# Patient Record
Sex: FEMALE | Race: BLACK OR AFRICAN AMERICAN | NOT HISPANIC OR LATINO | Employment: FULL TIME | URBAN - METROPOLITAN AREA
[De-identification: names, ages, dates, MRNs, and addresses within clinical notes are randomized per-mention and may not be internally consistent; named-entity substitution may affect disease eponyms.]

---

## 2019-10-10 ENCOUNTER — OFFICE VISIT (OUTPATIENT)
Dept: URGENT CARE | Facility: CLINIC | Age: 34
End: 2019-10-10
Payer: COMMERCIAL

## 2019-10-10 VITALS
HEIGHT: 67 IN | OXYGEN SATURATION: 100 % | RESPIRATION RATE: 16 BRPM | HEART RATE: 81 BPM | DIASTOLIC BLOOD PRESSURE: 69 MMHG | WEIGHT: 116 LBS | SYSTOLIC BLOOD PRESSURE: 105 MMHG | BODY MASS INDEX: 18.21 KG/M2 | TEMPERATURE: 97 F

## 2019-10-10 DIAGNOSIS — M79.644 THUMB PAIN, RIGHT: Primary | ICD-10-CM
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
## 2019-10-10 PROCEDURE — 99203 PR OFFICE/OUTPT VISIT, NEW, LEVL III, 30-44 MIN: ICD-10-PCS | Mod: S$GLB,,, | Performed by: NURSE PRACTITIONER

## 2019-10-10 PROCEDURE — 99203 OFFICE O/P NEW LOW 30 MIN: CPT | Mod: S$GLB,,, | Performed by: NURSE PRACTITIONER

## 2019-10-10 PROCEDURE — 73140 XR FINGER 2 OR MORE VIEWS: ICD-10-PCS | Mod: RT,S$GLB,, | Performed by: RADIOLOGY

## 2019-10-10 PROCEDURE — 73140 X-RAY EXAM OF FINGER(S): CPT | Mod: RT,S$GLB,, | Performed by: RADIOLOGY

## 2019-10-10 NOTE — LETTER
[facility] - [address], [address]  [facility] [address]  Phone: [phone]  Fax: [phone]  [facility]: [phone]    Pt Name: Dimas Lynn  Injury Date: 10/09/2019   Employee ID:  Date of  Treatment: 10/10/2019   Company: [employer] to [employer]       Appointment Time: 10:00 AM Arrived: 10:15AM   Provider: Marianna Li NP Time Out:11:45AM     Office Treatment:   1. Thumb pain, right    2. Work related injury          Patient Instructions: Attention not to aggravate affected area, Apply ice 24-48 hours then apply heat/warm soaks, Use splint as directed(TYLENOL OR IBUPROFEN AS DIRECTED)    Restrictions: Limited use of right hand and arm, Disabled until next office visit     Return Appointment: [facility] Monday, 10/14/2019 AT 12:00 PM

## 2019-10-10 NOTE — PROGRESS NOTES
"Subjective:       Patient ID: Dimas Lynn is a 33 y.o. female.    Vitals:  height is 5' 7" (1.702 m) and weight is 52.6 kg (116 lb). Her temperature is 97.2 °F (36.2 °C). Her blood pressure is 105/69 and her pulse is 81. Her respiration is 16 and oxygen saturation is 100%.     Chief Complaint: Hand Pain    Pt c/o right thumb pain x 1 day.   ORIGINAL INJURY: 10/9/2019- . WAS LIFTING BINS/CARRING BIN AND ATTEMPTED TO PULL OPEN DOOR, RIGHT THUMB BENT BACKWARDS- SLIGHT PAIN UPON INJURY AND HAS PROGRESSIVELY BECOME WORSE OVER THE PAST DAY. NO HX OF INJURY/FRACTURE TO THE RIGHT HAND. REPORTS PAIN WITH MOVEMENT, /STRENGTH.    Hand Pain    The incident occurred 12 to 24 hours ago. The incident occurred at work. The pain is present in the right fingers. The quality of the pain is described as aching. The pain does not radiate. The pain is at a severity of 3/10. The pain is mild. She has tried acetaminophen for the symptoms. The treatment provided mild relief.       Constitution: Negative for fatigue.   HENT: Negative for facial swelling and facial trauma.    Neck: Negative for neck stiffness.   Cardiovascular: Negative for chest trauma.   Eyes: Negative for eye trauma, double vision and blurred vision.   Gastrointestinal: Negative for abdominal trauma, abdominal pain and rectal bleeding.   Genitourinary: Negative for hematuria, missed menses, genital trauma and pelvic pain.   Musculoskeletal: Positive for pain, trauma and joint pain. Negative for joint swelling and abnormal ROM of joint.   Skin: Negative for color change, wound, abrasion, laceration and bruising.   Neurological: Negative for dizziness, history of vertigo, light-headedness, coordination disturbances, altered mental status and loss of consciousness.   Hematologic/Lymphatic: Negative for history of bleeding disorder.   Psychiatric/Behavioral: Negative for altered mental status.       Objective:      Physical Exam   Musculoskeletal:        " Right wrist: She exhibits normal range of motion, no tenderness, no bony tenderness, no deformity and no laceration.        Right hand: She exhibits decreased range of motion, tenderness and bony tenderness. She exhibits normal capillary refill, no laceration and no swelling. Normal sensation noted. Decreased strength noted. She exhibits finger abduction and thumb/finger opposition.        Hands:  PAIN AND LIMITED RIGHT THUMB FLEXION- PAIN ALONG DORSAL SURFACE , MEDIAL ASPECT OF HAND, NO PAIN AND FULL ROM WITH THUMB EXTENSION, FULL SENSATION, 2SEC CAP REFILL/WARM/PINK DISTAL TO INJURY.     Xr Finger 2 Or More Views    Result Date: 10/10/2019  EXAMINATION: XR FINGER 2 OR MORE VIEWS CLINICAL HISTORY: Pain in right finger(s) TECHNIQUE: Right thumb three views COMPARISON: None. FINDINGS: Bones are fairly well mineralized. Alignment is satisfactory. No fracture or significant soft tissue abnormality     No acute abnormality seen. Electronically signed by: Kenn Taylor MD Date:    10/10/2019 Time:    12:04        Assessment:       1. Thumb pain, right    2. Work related injury        Plan:     ICE 2-3 TIMES A DAY, WARM COMPRESS 1-2 TIMES , REST, DO NOT AGGRAVATE RIGHT HAND. SPOKE TO AUTHORIZING SUPERVISOR: BRITTANY CUEVA [phone]. Reviewed plan of care with dr. Renae.       ATTN NOT TO AGGRAVATE RIGHT HAND-  F/U WITH [facility] MONDAY FOR WORK RESTRICTIONS AND TREATMENT PLAN.   [facility] ADDR[facility]   [address] [place] [id number]      Thumb pain, right  -     XR FINGER 2 OR MORE VIEWS; Future; Expected date: 10/10/2019  -     ORTHOPEDIC BRACING FOR HOME USE - UPPER EXTREMITY  -     Ambulatory referral to Occupational Medicine    Work related injury  -     Ambulatory referral to Occupational Medicine      Patient Instructions     Follow up with your doctor in a few days.  Return to the urgent care or go to the ER if symptoms get worse.      Self-Care for Strains and Sprains  Most  minor strains and sprains can be treated with self-care. Recovering from a strain or sprain may take 6 to 8 weeks. Your self-care goal is to reduce pain and immobilize the injury to speed healing.     A sprain injures ligaments (tissue that connects bones to bones).        A strain injures muscles or tendons (tissue that connects muscles to bones).   Support the injured area  Wrapping the injured area provides support for short, necessary activities. Be careful not to wrap the area too tightly. This could cut off the blood supply.  · Support a wrist, elbow, or shoulder with a sling.  · Wrap an ankle or knee with an elastic bandage.  · Tape a finger or toe to the one next to it.  Use cold and heat  Cold reduces swelling. Both cold and heat reduce pain. Heat should not be used in the initial treatment of the injury. When using cold or heat, always place a towel between the pack and your skin.  · Apply ice or a cold pack 10 to 15 minutes every hour youre awake for the first 2 days.  · After the swelling goes down, use cold or heat to control pain. Dont use heat late in the day, since it can cause swelling when youre not active.  Rest and elevate  Rest and elevation help your injury heal faster.  · Raise the injured area above your heart level.  · Keep the injured area from moving.  · Limit the use of the joint or limb.  Use medicine  · Aspirin reduces pain and swelling. (Note: Dont give aspirin to a child 18 or younger unless prescribed by the doctor.)  · Aspirin substitutes, such as ibuprofen, can reduce pain. Some substitutes reduce swelling, too. Ask your pharmacist which substitutes you can use.  Call your doctor if:  · The injured joint wont move, or bones make a grating sound when they move.  · You cant put weight on the injured area, even after 24 hours.  · The injured body part is cold, blue, or numb.  · The joint or limb appears bent or crooked.  · Pain increases or doesnt improve in 4 days.  · When  pressing along the injured area, you notice a spot that is especially painful.   Date Last Reviewed: 9/29/2015  © 6009-4435 [employer]. [address] [place] [address], [place] [address]. All rights reserved. This information is not intended as a substitute for professional medical care. Always follow your healthcare professional's instructions.

## 2019-10-10 NOTE — PATIENT INSTRUCTIONS
Follow up with your doctor in a few days.  Return to the urgent care or go to the ER if symptoms get worse.      Self-Care for Strains and Sprains  Most minor strains and sprains can be treated with self-care. Recovering from a strain or sprain may take 6 to 8 weeks. Your self-care goal is to reduce pain and immobilize the injury to speed healing.     A sprain injures ligaments (tissue that connects bones to bones).        A strain injures muscles or tendons (tissue that connects muscles to bones).   Support the injured area  Wrapping the injured area provides support for short, necessary activities. Be careful not to wrap the area too tightly. This could cut off the blood supply.  · Support a wrist, elbow, or shoulder with a sling.  · Wrap an ankle or knee with an elastic bandage.  · Tape a finger or toe to the one next to it.  Use cold and heat  Cold reduces swelling. Both cold and heat reduce pain. Heat should not be used in the initial treatment of the injury. When using cold or heat, always place a towel between the pack and your skin.  · Apply ice or a cold pack 10 to 15 minutes every hour youre awake for the first 2 days.  · After the swelling goes down, use cold or heat to control pain. Dont use heat late in the day, since it can cause swelling when youre not active.  Rest and elevate  Rest and elevation help your injury heal faster.  · Raise the injured area above your heart level.  · Keep the injured area from moving.  · Limit the use of the joint or limb.  Use medicine  · Aspirin reduces pain and swelling. (Note: Dont give aspirin to a child 18 or younger unless prescribed by the doctor.)  · Aspirin substitutes, such as ibuprofen, can reduce pain. Some substitutes reduce swelling, too. Ask your pharmacist which substitutes you can use.  Call your doctor if:  · The injured joint wont move, or bones make a grating sound when they move.  · You cant put weight on the injured area, even after 24  hours.  · The injured body part is cold, blue, or numb.  · The joint or limb appears bent or crooked.  · Pain increases or doesnt improve in 4 days.  · When pressing along the injured area, you notice a spot that is especially painful.   Date Last Reviewed: 9/29/2015  © 8039-7534 [employer]. [address] [place] [address], [place] [address]. All rights reserved. This information is not intended as a substitute for professional medical care. Always follow your healthcare professional's instructions.

## 2019-10-10 NOTE — LETTER
[facility] - [address], [address]  [place] [address]  Phone: [phone]  Fax: [phone]  [facility]: [id number]    Pt Name: Dimas Lynn  Injury Date: 10/09/2019   Employee ID:  Date of First Treatment: 10/10/2019   Company: [employer]       Appointment Time: 10:00 AM Arrived: 10:15am   Provider: Marianna Li NP Time Out:12:00pm     Office Treatment:   1. Thumb pain, right    2. Work related injury          Patient Instructions: Attention not to aggravate affected area, Apply ice 24-48 hours then apply heat/warm soaks, Use splint as directed(TYLENOL OR IBUPROFEN AS DIRECTED)    Restrictions: Limited use of right hand and arm, No driving company vehicles     Return Appointment: MOnday, Oct 14, 2019

## 2019-10-11 ENCOUNTER — OFFICE VISIT (OUTPATIENT)
Dept: URGENT CARE | Facility: CLINIC | Age: 34
End: 2019-10-11
Payer: COMMERCIAL

## 2019-10-11 VITALS
TEMPERATURE: 97 F | WEIGHT: 116 LBS | SYSTOLIC BLOOD PRESSURE: 115 MMHG | DIASTOLIC BLOOD PRESSURE: 63 MMHG | HEIGHT: 67 IN | OXYGEN SATURATION: 100 % | BODY MASS INDEX: 18.21 KG/M2 | HEART RATE: 84 BPM

## 2019-10-11 DIAGNOSIS — M79.644 THUMB PAIN, RIGHT: ICD-10-CM
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
## 2019-10-11 PROCEDURE — 99214 OFFICE O/P EST MOD 30 MIN: CPT | Mod: S$GLB,,, | Performed by: PHYSICIAN ASSISTANT

## 2019-10-11 PROCEDURE — 99214 PR OFFICE/OUTPT VISIT, EST, LEVL IV, 30-39 MIN: ICD-10-PCS | Mod: S$GLB,,, | Performed by: PHYSICIAN ASSISTANT

## 2019-10-11 NOTE — LETTER
[facility] - [address], [address]  [facility] [address]  Phone: [phone]  Fax: [phone]  [facility]: [email or web address]    Pt Name: Dimas Lynn  Injury Date: 10/11/2019   Employee ID:  Date of First Treatment: 10/11/2019   Company: [employer]       Appointment Time: 06:35 PM Arrived: 6:50PM   Provider: Geraldine Bertrand PA-C Time Out: 7:45PM     Office Treatment:   1. Work related injury    2. Thumb pain, right                      Restriction: Regular work. No use of right hand. Follow-up on Monday morning at [facility] [place] location as discussed. If work is unable to accommodate these restrictions, then work is to send patient home.    Return Appointment: Visit date not found at Monday, October 14, 2019 at [facility].

## 2019-10-12 NOTE — PATIENT INSTRUCTIONS
If you were prescribed a narcotic or controlled medication, do not drive or operate heavy equipment or machinery while taking these medications.  You must understand that you've received an Urgent Care treatment only and that you may be released before all your medical problems are known or treated. You, the patient, will arrange for follow up care as instructed.  Follow up with your PCP or specialty clinic as directed if not improved or as needed. You can call [phone] to schedule an appointment with the appropriate provider.  If your condition worsens we recommend that you receive another evaluation at the Emergency Department for any concerns or worsening of condition.  Patient aware and verbalized understanding.    Rest, Ice, Compression and Elevation as discussed.  ACE Wrap/Wrist Splint for better support/comfort.  OTC Ibuprofen or Tylenol every 4-6 hours as needed for pain.  You may do gentle stretching as tolerable.  Wear supportive shoes such as tennis shoes for better support/comfort.  Follow-up with [facility] [place] ON Monday for further evaluation as needed.  Strict ER precautions given to patient.  Patient aware and verbalized understanding.    R.I.C.E.    R.I.C.E. stands for Rest, Ice, Compression, and Elevation. Doing these things helps limit pain and swelling after an injury. R.I.C.E. also helps injuries heal faster. Use R.I.C.E. for sprains, strains, and severe bruises or bumps. Follow the tips on this handout and begin R.I.C.E. as soon as possible after an injury.  ? Rest  Pain is your bodys way of telling you to rest an injured area. Whether you have hurt an elbow, hand, foot, or knee, limiting its use will prevent further injury and help you heal.  ? Ice  Applying ice right after an injury helps prevent swelling and reduce pain. Dont place ice directly on your skin.  · Wrap a cold pack or bag of ice in a thin cloth. Place it over the injured area.  · Ice for 10 minutes  every 3 hours. Dont ice for more than 20 minutes at a time.  ? Compression  Putting pressure (compression) on an injury helps prevent swelling and provides support.  · Wrap the injured area firmly with an elastic bandage. If your hand or foot tingles, becomes discolored, or feels cold to the touch, the bandage may be too tight. Rewrap it more loosely.  · If your bandage becomes too loose, rewrap it.  · Do not wear an elastic bandage overnight.  ? Elevation  Keeping an injury elevated helps reduce swelling, pain, and throbbing. Elevation is most effective when the injury is kept elevated higher than the heart.     Call your healthcare provider if you notice any of the following:  · Fingers or toes feel numb, are cold to the touch, or change color  · Skin looks shiny or tight  · Pain, swelling, or bruising worsens and is not improved with elevation   Date Last Reviewed: 9/3/2015  © 6609-6813 [employer]. [address] [place] [address], [place] [address]. All rights reserved. This information is not intended as a substitute for professional medical care. Always follow your healthcare professional's instructions.

## 2019-10-12 NOTE — PROGRESS NOTES
"Subjective:       Patient ID: Dimas Lynn is a 33 y.o. female.    Chief Complaint: Wrist Injury (right wrist/thumb)    Patient presents to urgent care today for work-related R thumb injury. Patient was seen here for original injury on 10/9/2019 - ". WAS LIFTING BINS/CARRING BIN AND ATTEMPTED TO PULL OPEN DOOR, RIGHT THUMB BENT BACKWARDS- SLIGHT PAIN UPON INJURY AND HAS PROGRESSIVELY BECOME WORSE OVER THE PAST DAY. NO HX OF INJURY/FRACTURE TO THE RIGHT HAND. REPORTS PAIN WITH MOVEMENT, /STRENGTH. Patient reports that she was given work restrictions to return to work with light duty/no use of her R hand and to follow-up with [facility] in [place] for further evaluation, because the [place] location is closer to her home. Patient presents today because patient reports that "she went to work today and was unable to rest the R hand.  Patient states her R thumb has been throbbing all day and progressively getting worse. Patient reports that she  did not get a break to rest the hand today, so her job told her to report back to the urgent care for further evaluation". Patient currently denies numbness, tingling or weakness of R hand.    Wrist Injury    The incident occurred 2 days ago. The incident occurred at work. The injury mechanism was repetitive motion. The pain is present in the right hand and right wrist. The quality of the pain is described as aching and shooting. The pain is at a severity of 8/10. The pain is moderate. The pain has been worsening since the incident. Pertinent negatives include no chest pain, numbness or tingling. The symptoms are aggravated by movement and lifting. She has tried nothing for the symptoms. The treatment provided no relief.     Review of Systems   Constitution: Negative for chills and fever.   HENT: Negative for sore throat.    Eyes: Negative for blurred vision.   Cardiovascular: Negative for chest pain.   Respiratory: Negative for shortness of breath.    Skin: " Negative for color change, dry skin and rash.   Musculoskeletal: Positive for joint pain, muscle cramps and stiffness. Negative for back pain, joint swelling and muscle weakness.   Gastrointestinal: Negative for abdominal pain, diarrhea, nausea and vomiting.   Neurological: Negative for headaches, numbness and tingling.   Psychiatric/Behavioral: The patient is not nervous/anxious.        Objective:              Assessment:       1. Work related injury    2. Thumb pain, right        Plan:     Patient reports that she was supposed to do light duty throughout the day, but patient reports that she spoke with supervisor today and wanted to take a break at work today, but there was a discrepancy between the work note and no driving company vehicles/driving private vehicle. Patient reports that she went to [place] and was told that Occupational Medicine MD was not there and to follow-up with original appointment on Monday at [place]. Went to back work and was told to return here to be re -evaluated by Urgent Care clinic due to re-injury at work today.       Dimas was seen today for wrist injury.    Diagnoses and all orders for this visit:    Work related injury  -     Ambulatory referral to Occupational Medicine    Thumb pain, right      Patient Instructions     If you were prescribed a narcotic or controlled medication, do not drive or operate heavy equipment or machinery while taking these medications.  You must understand that you've received an Urgent Care treatment only and that you may be released before all your medical problems are known or treated. You, the patient, will arrange for follow up care as instructed.  Follow up with your PCP or specialty clinic as directed if not improved or as needed. You can call [phone] to schedule an appointment with the appropriate provider.  If your condition worsens we recommend that you receive another evaluation at the Emergency Department for any concerns or  worsening of condition.  Patient aware and verbalized understanding.    Rest, Ice, Compression and Elevation as discussed.  ACE Wrap during the day for better support/comfort.  OTC Ibuprofen or Tylenol every 4-6 hours as needed for pain.  You may do gentle stretching as tolerable.  Wear supportive shoes such as tennis shoes for better support/comfort.  Follow-up with PCP for further evaluation as needed.  Follow-up with Ortho for further evaluation if still experiencing pain as discussed.  Strict ER precautions given to patient.  Patient aware and verbalized understanding.      R.I.C.E.    R.I.C.E. stands for Rest, Ice, Compression, and Elevation. Doing these things helps limit pain and swelling after an injury. R.I.C.E. also helps injuries heal faster. Use R.I.C.E. for sprains, strains, and severe bruises or bumps. Follow the tips on this handout and begin R.I.C.E. as soon as possible after an injury.  ? Rest  Pain is your bodys way of telling you to rest an injured area. Whether you have hurt an elbow, hand, foot, or knee, limiting its use will prevent further injury and help you heal.  ? Ice  Applying ice right after an injury helps prevent swelling and reduce pain. Dont place ice directly on your skin.  · Wrap a cold pack or bag of ice in a thin cloth. Place it over the injured area.  · Ice for 10 minutes every 3 hours. Dont ice for more than 20 minutes at a time.  ? Compression  Putting pressure (compression) on an injury helps prevent swelling and provides support.  · Wrap the injured area firmly with an elastic bandage. If your hand or foot tingles, becomes discolored, or feels cold to the touch, the bandage may be too tight. Rewrap it more loosely.  · If your bandage becomes too loose, rewrap it.  · Do not wear an elastic bandage overnight.  ? Elevation  Keeping an injury elevated helps reduce swelling, pain, and throbbing. Elevation is most effective when the injury is kept elevated higher than the  heart.     Call your healthcare provider if you notice any of the following:  · Fingers or toes feel numb, are cold to the touch, or change color  · Skin looks shiny or tight  · Pain, swelling, or bruising worsens and is not improved with elevation   Date Last Reviewed: 9/3/2015  © 9620-1916 [employer]. [address] [place] [address], [place] [address]. All rights reserved. This information is not intended as a substitute for professional medical care. Always follow your healthcare professional's instructions.                      Follow up if symptoms worsen or fail to improve.

## 2019-10-13 NOTE — PROGRESS NOTES
"Subjective:       Patient ID: Dimas Lynn is a 33 y.o. female.    Chief Complaint: Wrist Injury (right hand/thumb)    Patient presents to urgent care today for work-related R thumb injury. Patient was seen here for original injury on 10/10/2019 - ". WAS LIFTING BINS/CARRING BIN AND ATTEMPTED TO PULL OPEN DOOR, RIGHT THUMB BENT BACKWARDS- SLIGHT PAIN UPON INJURY AND HAS PROGRESSIVELY BECOME WORSE OVER THE PAST DAY. NO HX OF INJURY/FRACTURE TO THE RIGHT HAND. REPORTS PAIN WITH MOVEMENT, /STRENGTH. Patient reports that she was given work restrictions to return to work with light duty/no use of her R hand and to follow-up with [facility] in [place] for further evaluation, because the [place] location is closer to her home. Patient presents today because patient reports that "she went to work today and was unable to rest the R hand.  Patient states her R thumb has been throbbing all day and progressively getting worse. Patient reports that she  did not get a break to rest the hand today, so her job told her to report back to the urgent care for further evaluation". Patient currently denies numbness, tingling or weakness of R hand.    Hand Pain    The incident occurred 2 days ago. The incident occurred at work. The pain is present in the right hand (R thumb). The quality of the pain is described as aching. The pain does not radiate. The pain is at a severity of 6/10. The pain is moderate. The pain has been constant since the incident. Pertinent negatives include no chest pain, muscle weakness, numbness or tingling. The symptoms are aggravated by movement, lifting and palpation. She has tried ice, elevation and NSAIDs for the symptoms. The treatment provided no relief.     Review of Systems   Constitution: Negative for chills, diaphoresis, fever and malaise/fatigue.   HENT: Negative for congestion, ear pain, sore throat and stridor.    Eyes: Negative for blurred vision, discharge, double vision, pain, " photophobia and visual disturbance.   Cardiovascular: Negative for chest pain, dyspnea on exertion, near-syncope, palpitations and syncope.   Respiratory: Negative for cough, hemoptysis, shortness of breath, sputum production and wheezing.    Skin: Negative for itching and rash.   Musculoskeletal: Positive for joint pain and joint swelling. Negative for back pain, falls, muscle cramps, muscle weakness, myalgias, neck pain and stiffness.   Gastrointestinal: Negative for bloating, abdominal pain, constipation, diarrhea, heartburn, nausea and vomiting.   Genitourinary: Negative for dysuria, flank pain, hematuria and pelvic pain.   Neurological: Negative for dizziness, headaches, light-headedness, loss of balance, numbness, seizures, tingling and tremors.   Psychiatric/Behavioral: Negative for altered mental status. The patient is not nervous/anxious.        Objective:      Physical Exam   Constitutional: She is oriented to person, place, and time. She appears well-developed and well-nourished.  Non-toxic appearance. She does not appear ill. No distress.   HENT:   Head: Normocephalic and atraumatic.   Right Ear: Hearing, tympanic membrane, external ear and ear canal normal.   Left Ear: Hearing, tympanic membrane, external ear and ear canal normal.   Nose: Nose normal.   Mouth/Throat: Uvula is midline and oropharynx is clear and moist. No posterior oropharyngeal erythema.   Eyes: Pupils are equal, round, and reactive to light. Conjunctivae, EOM and lids are normal.   Neck: Normal range of motion and full passive range of motion without pain. Neck supple.   Cardiovascular: Normal rate, regular rhythm, normal heart sounds and intact distal pulses.   Pulmonary/Chest: Effort normal and breath sounds normal. No accessory muscle usage or stridor. She has no decreased breath sounds. She has no wheezes. She has no rhonchi. She has no rales.   Abdominal: Soft. Bowel sounds are normal. There is no tenderness.   Musculoskeletal:         Right wrist: Normal.        Left wrist: Normal.        Right hand: She exhibits decreased range of motion and bony tenderness. She exhibits no tenderness, normal two-point discrimination, normal capillary refill, no deformity, no laceration and no swelling. Normal sensation noted. Decreased strength noted. She exhibits finger abduction and thumb/finger opposition. She exhibits no wrist extension trouble.        Left hand: Normal.   Limited ROM secondary to pain with R hand/thumb flexion. TTP over dorsal aspect of R hand/thumb. Full ROM with R hand/thumb extension without pain.  3/5  strength and full sensation bilateral.  2+ radial pulses bilateral. No numbness or tingling or referred RUE pain. Able to ambulate without difficulty.   Neurological: She is alert and oriented to person, place, and time.   Skin: Skin is warm and dry. Capillary refill takes less than 2 seconds.         Xr Finger 2 Or More Views  Result Date: 10/10/2019  EXAMINATION: XR FINGER 2 OR MORE VIEWS CLINICAL HISTORY: Pain in right finger(s) TECHNIQUE: Right thumb three views COMPARISON: None. FINDINGS: Bones are fairly well mineralized. Alignment is satisfactory. No fracture or significant soft tissue abnormality   No acute abnormality seen. Electronically signed by: Kenn Taylor MD Date:    10/10/2019 Time:    12:04    Offered patient to have another XRAY done in clinic today to compare from original injury, but patient refused at this time. Patient aware and verbalized understanding.    Assessment:       1. Work related injury    2. Thumb pain, right        Plan:     Patient's original work-related injury occurred on 10/10/2019, xray was negative and was given work restrictions to 'return back to work with light duty and no driving company vehicles'. Patient reports that she is upset because whenever she was at work today, she still had to use her R hand and whenever she asked to take a break at work because her R thumb was hurting,  her supervisor told her that there was a discrepancy between the wording of the work note with 'no driving company vehicles' versus driving her private vehicle. So, patient reports that her supervisor sent patient here to have note changed to clear up the discrepancy.     Patient reports here ([facility]) and I called Dr. Dyer to discuss patient's treatment plan of care and work restrictions and he agreed/advised me to change patient's work note to eliminate the confusion for the patient and patient's supervisor.     Patient reports that she went back to work after note was changed, but supervisor told her to go to the [facility] to see the Occupational Health provider on staff for further evaluation. Patient reports that it was after hours and the Occupational Medicine Provider had left for the day, but was able to speak with Tre Bravo, the manager of Occupational Health, twice on the phone to help her with her situation. Patient reports that after speaking with Tre, she went back to work and was told to return back here to be re-evaluated by the Urgent Care clinic.    Patient returned here ([facility]) and MA spoke with Jillian to verify that 2nd visit was approved by patient's work. Jillian authorized that 2nd visit was confirmed and to move forward with visit. So, personally discussed patient's treatment plan of care with Tre Bravo from Occupational Health and Dr. Dyer throughout entire visit to ensure that I was following proper Occupational Health protocol. After I completely patient's exam done in clinic today, discussed treatment plan of care with patient to follow-up on Monday morning with Occupational Medicine, as originally scheduled, at the [facility] because this is closer to patient's home and also to return back to work with the same restrictions that she was originally given the first time that she was seen on 10/10/2019.  "    Patient was very tearful on exam after this decision was made because patient reports that "she is in pain and does not think it is right for her to go back to work with this injury". I again explained to patient that I am unable to give patient days off of work because Tre Bravo spoke with patient's supervisor and supervisor reports that they will make accommodations to find her something else to do at work and if unable to do so, then she will be dismissed home". Patient was still unsatisfied with this result, so again, I spoke with Tre De Dios and Dr. Dyer again to confirm/clearify that this is the best treatment plan of care that can be made at this time and to follow-up with Occupational Medicine in [place] on Monday morning as scheduled for further evaluation. Patient aware, verbalized understanding and agreed with plan of care.    Dimas was seen today for wrist injury.    Diagnoses and all orders for this visit:    Work related injury  -     Ambulatory referral to Occupational Medicine    Thumb pain, right      Patient Instructions     If you were prescribed a narcotic or controlled medication, do not drive or operate heavy equipment or machinery while taking these medications.  You must understand that you've received an Urgent Care treatment only and that you may be released before all your medical problems are known or treated. You, the patient, will arrange for follow up care as instructed.  Follow up with your PCP or specialty clinic as directed if not improved or as needed. You can call [phone] to schedule an appointment with the appropriate provider.  If your condition worsens we recommend that you receive another evaluation at the Emergency Department for any concerns or worsening of condition.  Patient aware and verbalized understanding.    Rest, Ice, Compression and Elevation as discussed.  ACE Wrap/Wrist Splint for better support/comfort.  OTC Ibuprofen or Tylenol every 4-6 " hours as needed for pain.  You may do gentle stretching as tolerable.  Wear supportive shoes such as tennis shoes for better support/comfort.  Follow-up with OCCUPATIONAL HEALTH IN [place] ON Monday for further evaluation as needed.  Strict ER precautions given to patient.  Patient aware and verbalized understanding.    R.I.C.E.    R.I.C.E. stands for Rest, Ice, Compression, and Elevation. Doing these things helps limit pain and swelling after an injury. R.I.C.E. also helps injuries heal faster. Use R.I.C.E. for sprains, strains, and severe bruises or bumps. Follow the tips on this handout and begin R.I.C.E. as soon as possible after an injury.  ? Rest  Pain is your bodys way of telling you to rest an injured area. Whether you have hurt an elbow, hand, foot, or knee, limiting its use will prevent further injury and help you heal.  ? Ice  Applying ice right after an injury helps prevent swelling and reduce pain. Dont place ice directly on your skin.  · Wrap a cold pack or bag of ice in a thin cloth. Place it over the injured area.  · Ice for 10 minutes every 3 hours. Dont ice for more than 20 minutes at a time.  ? Compression  Putting pressure (compression) on an injury helps prevent swelling and provides support.  · Wrap the injured area firmly with an elastic bandage. If your hand or foot tingles, becomes discolored, or feels cold to the touch, the bandage may be too tight. Rewrap it more loosely.  · If your bandage becomes too loose, rewrap it.  · Do not wear an elastic bandage overnight.  ? Elevation  Keeping an injury elevated helps reduce swelling, pain, and throbbing. Elevation is most effective when the injury is kept elevated higher than the heart.     Call your healthcare provider if you notice any of the following:  · Fingers or toes feel numb, are cold to the touch, or change color  · Skin looks shiny or tight  · Pain, swelling, or bruising worsens and is not improved with elevation   Date Last  Reviewed: 9/3/2015  © 7347-4421 [employer]. [address] [place] [address], [place] [address]. All rights reserved. This information is not intended as a substitute for professional medical care. Always follow your healthcare professional's instructions.                        Follow up if symptoms worsen or fail to improve.

## 2019-10-14 ENCOUNTER — OFFICE VISIT (OUTPATIENT)
Dept: URGENT CARE | Facility: CLINIC | Age: 34
End: 2019-10-14
Payer: COMMERCIAL

## 2019-10-14 DIAGNOSIS — S63.641A SPRAIN OF METACARPOPHALANGEAL (MCP) JOINT OF RIGHT THUMB, INITIAL ENCOUNTER: Primary | ICD-10-CM
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
## 2019-10-14 PROCEDURE — 99203 PR OFFICE/OUTPT VISIT, NEW, LEVL III, 30-44 MIN: ICD-10-PCS | Mod: S$GLB,,, | Performed by: PHYSICIAN ASSISTANT

## 2019-10-14 PROCEDURE — 99203 OFFICE O/P NEW LOW 30 MIN: CPT | Mod: S$GLB,,, | Performed by: PHYSICIAN ASSISTANT

## 2019-10-14 RX ORDER — IBUPROFEN 800 MG/1
800 TABLET ORAL 3 TIMES DAILY
Qty: 30 TABLET | Refills: 0 | Status: SHIPPED | OUTPATIENT
Start: 2019-10-14 | End: 2022-04-12

## 2019-10-14 NOTE — PROGRESS NOTES
Subjective:       Patient ID: Dimas Lynn is a 33 y.o. female.    Chief Complaint: Hand Injury (R THUMB 10/11/2019)    Pt here for follow up injury of her R THUMB that occurred on 10/11/2019. Pt states she initially injured her R THUMB on 10/09/2019, was d/c from [facility] [employer] [facility] and had a re-injury of the same digit on 10/11/2019. Pt states her pain is 7/10 and ibuprofen is offering moderate relief. Pt denies new injury/complaint. SP    RHD    Hand Injury    Her dominant hand is their right hand. The incident occurred 3 to 5 days ago. The incident occurred at work. The injury mechanism was twisted. The pain is present in the right fingers. The quality of the pain is described as aching. The pain is at a severity of 7/10. The pain is mild. The pain has been constant since the incident. Pertinent negatives include no chest pain, muscle weakness, numbness or tingling. Nothing aggravates the symptoms. She has tried NSAIDs for the symptoms. The treatment provided moderate relief.   Hand Pain    The incident occurred 2 days ago. The incident occurred at work. The pain is present in the right hand (R thumb). The quality of the pain is described as aching. The pain does not radiate. The pain is at a severity of 6/10. The pain is moderate. The pain has been constant since the incident. Pertinent negatives include no chest pain, muscle weakness, numbness or tingling. The symptoms are aggravated by movement, lifting and palpation. She has tried ice, elevation and NSAIDs for the symptoms. The treatment provided no relief.     Review of Systems   Constitution: Negative for chills, fever and malaise/fatigue.   HENT: Negative for congestion, hearing loss and nosebleeds.    Eyes: Negative for blurred vision, redness and visual disturbance.   Cardiovascular: Negative for chest pain and syncope.   Respiratory: Negative for cough and shortness of breath.    Endocrine: Negative for polydipsia.   Hematologic/Lymphatic: Negative for  bleeding problem.   Skin: Negative for color change, poor wound healing and rash.   Musculoskeletal: Positive for joint pain and muscle weakness. Negative for back pain and neck pain.   Gastrointestinal: Negative for abdominal pain and nausea.   Genitourinary: Negative for flank pain.   Neurological: Negative for loss of balance, numbness, paresthesias and tingling.   Psychiatric/Behavioral: Negative for altered mental status. The patient is not nervous/anxious.    Allergic/Immunologic: Negative for persistent infections.       Objective:      Physical Exam   Constitutional: She appears well-developed and well-nourished. She is active. No distress.   HENT:   Head: Normocephalic and atraumatic.   Right Ear: Hearing and external ear normal.   Left Ear: Hearing and external ear normal.   Nose: Nose normal. No nasal deformity. No epistaxis.   Mouth/Throat: Oropharynx is clear and moist and mucous membranes are normal.   Eyes: Conjunctivae and lids are normal. No scleral icterus.   Neck: Trachea normal and normal range of motion.   Cardiovascular: Intact distal pulses and normal pulses.   Pulmonary/Chest: Effort normal. No stridor. No respiratory distress.   Musculoskeletal:        Right hand: She exhibits tenderness. She exhibits normal range of motion, normal capillary refill, no deformity and no swelling. Normal sensation noted.        Hands:  Neurological: She is alert. She has normal strength. She is not disoriented. No sensory deficit. GCS eye subscore is 4. GCS verbal subscore is 5. GCS motor subscore is 6.   Skin: Skin is warm, dry and intact. Capillary refill takes less than 2 seconds. She is not diaphoretic.   Psychiatric: She has a normal mood and affect. Her speech is normal and behavior is normal. She is attentive.   Nursing note and vitals reviewed.      Assessment:       1. Sprain of metacarpophalangeal (MCP) joint of right thumb, initial encounter    2. Work related injury        Plan:         Medications  Ordered This Encounter   Medications    ibuprofen (ADVIL,MOTRIN) 800 MG tablet     Sig: Take 1 tablet (800 mg total) by mouth 3 (three) times daily. Take with meals.     Dispense:  30 tablet     Refill:  0     Patient Instructions: Daily home exercises/warm soaks, Use splint as directed   Restrictions: (See CA-17)  Follow up in about 1 week (around 10/21/2019).

## 2019-10-14 NOTE — LETTER
[facility]  [address]  [place] [address]  Phone: [phone]  Fax: [phone]  [facility]: [id number]    Pt Name: Dimas Lynn  Injury Date: 10/09/2019   Employee ID: [id number] Date ofTreatment: 10/14/2019   Company: [email or web address]      Appointment Time: 11:45 AM Arrived: 11:50 AM   Provider: Rashad Nguyen PA-C Time Out: 1:15 PM     Office Treatment:   EXAM  RX GIVEN  SEE CA-17    1. Sprain of metacarpophalangeal (MCP) joint of right thumb, initial encounter    2. Work related injury      Medications Ordered This Encounter   Medications    ibuprofen (ADVIL,MOTRIN) 800 MG tablet      Patient Instructions: Daily home exercises/warm soaks, Use splint as directed    Restrictions: (See CA-17)     Return Appointment: 10/21/2019 at 10:00 AM  IJ

## 2019-10-21 ENCOUNTER — OFFICE VISIT (OUTPATIENT)
Dept: URGENT CARE | Facility: CLINIC | Age: 34
End: 2019-10-21
Payer: COMMERCIAL

## 2019-10-21 DIAGNOSIS — M79.644 THUMB PAIN, RIGHT: ICD-10-CM

## 2019-10-21 DIAGNOSIS — S63.641D SPRAIN OF METACARPOPHALANGEAL (MCP) JOINT OF RIGHT THUMB, SUBSEQUENT ENCOUNTER: Primary | ICD-10-CM

## 2019-10-21 PROCEDURE — 99213 PR OFFICE/OUTPT VISIT, EST, LEVL III, 20-29 MIN: ICD-10-PCS | Mod: S$GLB,,, | Performed by: FAMILY MEDICINE

## 2019-10-21 PROCEDURE — 99213 OFFICE O/P EST LOW 20 MIN: CPT | Mod: S$GLB,,, | Performed by: FAMILY MEDICINE

## 2019-10-21 NOTE — LETTER
[facility] [address]  [place] [address]  Phone: [phone]  Fax: [phone]  [facility]: [id number]    Pt Name: Dimas Lynn  Injury Date: 10/09/2019   Employee ID: [id number] Date of Treatment: 10/21/2019   Company: [email or web address]      Appointment Time: 09:45 AM Arrived: 9:42   Provider: Parag Youngblood MD Time Out: 11:07 a.m.     Office Treatment:  EXAM  Discharged from Occupational Health  Regular Duty     1. Sprain of metacarpophalangeal (MCP) joint of right thumb, initial encounter    2. Thumb pain, right          Patient Instructions: Attention not to aggravate affected area    Restrictions: Regular Duty, Discharged from Occupational Health     Return Appointment: Discharged  [place]

## 2019-10-21 NOTE — PROGRESS NOTES
Subjective:       Patient ID: Dimas Lynn is a 33 y.o. female.    Chief Complaint: Hand Injury (RT THUMB 10/11/19)    Pt here for follow up injury of her R THUMB that occurred on 10/11/2019. Pt states she initially injured her R THUMB on 10/09/2019 she is no longer having any pain. IJ      33-year-old  who has been on limited duty for the past 2 weeks due to her thumb injury presents to Occupational Health for follow-up of her injury to her right thumb.  Patient would like to return to full duty and denies any symptoms.  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    Hand Injury    Her dominant hand is their right hand. The incident occurred more than 1 week ago. The incident occurred at work. The injury mechanism was twisted. The pain is present in the right fingers. The quality of the pain is described as aching. The pain is at a severity of 0/10. The patient is experiencing no pain. The pain has been improving since the incident. Nothing aggravates the symptoms. She has tried NSAIDs for the symptoms. The treatment provided moderate relief.     Review of Systems   Constitution: Negative for chills, fever and malaise/fatigue.   HENT: Negative for congestion, hearing loss and nosebleeds.    Eyes: Negative for blurred vision, redness and visual disturbance.   Cardiovascular: Negative for syncope.   Respiratory: Negative for cough and shortness of breath.    Endocrine: Negative for polydipsia.   Hematologic/Lymphatic: Negative for bleeding problem.   Skin: Negative for color change, poor wound healing and rash.   Musculoskeletal: Positive for joint pain and muscle weakness. Negative for back pain and neck pain.   Gastrointestinal: Negative for abdominal pain and nausea.   Genitourinary: Negative for flank pain.   Neurological: Negative for loss of balance and paresthesias.   Psychiatric/Behavioral: Negative for altered mental status. The patient is  not nervous/anxious.    Allergic/Immunologic: Negative for persistent infections.       Objective:      Physical Exam   Constitutional: She is oriented to person, place, and time. She appears well-developed and well-nourished. She is active. No distress.   HENT:   Head: Normocephalic and atraumatic.   Right Ear: Hearing and external ear normal.   Left Ear: Hearing and external ear normal.   Nose: Nose normal. No nasal deformity. No epistaxis.   Mouth/Throat: Oropharynx is clear and moist and mucous membranes are normal.   Eyes: Pupils are equal, round, and reactive to light. Conjunctivae, EOM and lids are normal. No scleral icterus.   Neck: Trachea normal and normal range of motion. Neck supple.   Cardiovascular: Intact distal pulses and normal pulses.   Pulmonary/Chest: Effort normal. No stridor. No respiratory distress.   Musculoskeletal: Normal range of motion.        Right hand: She exhibits normal range of motion, no tenderness, normal capillary refill, no deformity and no swelling. Normal sensation noted.        Hands:  FROM. No tenderness. Finkelstein test negative       Neurological: She is alert and oriented to person, place, and time. She has normal strength. She is not disoriented. No sensory deficit. GCS eye subscore is 4. GCS verbal subscore is 5. GCS motor subscore is 6.   Skin: Skin is warm, dry and intact. Capillary refill takes less than 2 seconds. She is not diaphoretic.   Psychiatric: She has a normal mood and affect. Her speech is normal and behavior is normal. Judgment and thought content normal. She is attentive.   Nursing note and vitals reviewed.      Assessment:       1. Sprain of metacarpophalangeal (MCP) joint of right thumb, initial encounter    2. Thumb pain, right        Plan:            Patient Instructions: Attention not to aggravate affected area   Restrictions: Regular Duty, Discharged from Occupational Health  Follow up if symptoms worsen or fail to improve.

## 2020-07-13 ENCOUNTER — OFFICE VISIT (OUTPATIENT)
Dept: PRIMARY CARE CLINIC | Facility: CLINIC | Age: 35
End: 2020-07-13
Payer: COMMERCIAL

## 2020-07-13 VITALS
OXYGEN SATURATION: 100 % | HEART RATE: 87 BPM | TEMPERATURE: 98 F | SYSTOLIC BLOOD PRESSURE: 106 MMHG | RESPIRATION RATE: 20 BRPM | DIASTOLIC BLOOD PRESSURE: 61 MMHG

## 2020-07-13 DIAGNOSIS — R51.9 HEAD ACHE: ICD-10-CM

## 2020-07-13 DIAGNOSIS — U07.1 COVID-19: Primary | ICD-10-CM

## 2020-07-13 PROCEDURE — 99203 PR OFFICE/OUTPT VISIT, NEW, LEVL III, 30-44 MIN: ICD-10-PCS | Mod: S$GLB,,, | Performed by: EMERGENCY MEDICINE

## 2020-07-13 PROCEDURE — U0003 INFECTIOUS AGENT DETECTION BY NUCLEIC ACID (DNA OR RNA); SEVERE ACUTE RESPIRATORY SYNDROME CORONAVIRUS 2 (SARS-COV-2) (CORONAVIRUS DISEASE [COVID-19]), AMPLIFIED PROBE TECHNIQUE, MAKING USE OF HIGH THROUGHPUT TECHNOLOGIES AS DESCRIBED BY CMS-2020-01-R: HCPCS

## 2020-07-13 PROCEDURE — 99203 OFFICE O/P NEW LOW 30 MIN: CPT | Mod: S$GLB,,, | Performed by: EMERGENCY MEDICINE

## 2020-07-13 NOTE — PROGRESS NOTES
"Subjective:        Time seen by provider: 8:21 AM on 07/13/2020    Dimas Lynn is a 34 y.o. female who presents for an evaluation of possible COVID-19. The patient states she began experiencing "flu-like symptoms" 2 days ago. She c/o generalized body aches, sweats, cough, as well as transient episode of diarrhea. She denies fever, N/V or any other symptoms at this time. Patient reports frequent contact with an individual who tested positive for COVID-19 yesterday, noting the individual was experiencing symptoms prior to testing positive. No pertinent PMHx or PSHx.     Review of Systems   Constitutional: Positive for diaphoresis. Negative for activity change, appetite change, fatigue and fever.   HENT: Negative for congestion, rhinorrhea and sore throat.    Respiratory: Positive for cough. Negative for chest tightness, shortness of breath and wheezing.    Cardiovascular: Negative for chest pain and palpitations.   Gastrointestinal: Positive for diarrhea (resolved). Negative for nausea and vomiting.   Musculoskeletal: Positive for myalgias (generalized). Negative for arthralgias.   Skin: Negative for rash.   Neurological: Negative for weakness, light-headedness, numbness and headaches.       Objective:      Physical Exam  Vitals signs and nursing note reviewed.   Constitutional:       General: She is not in acute distress.     Appearance: She is well-developed. She is not diaphoretic.   HENT:      Head: Normocephalic and atraumatic.      Nose: Nose normal.   Eyes:      Conjunctiva/sclera: Conjunctivae normal.   Neck:      Musculoskeletal: Normal range of motion.   Cardiovascular:      Rate and Rhythm: Normal rate and regular rhythm.      Heart sounds: Normal heart sounds. No murmur.   Pulmonary:      Effort: No respiratory distress.      Breath sounds: Normal breath sounds. No wheezing.   Musculoskeletal: Normal range of motion.   Skin:     General: Skin is warm and dry.   Neurological:      Mental Status: She is " alert and oriented to person, place, and time.         Assessment and Plan:      Diagnoses and all orders for this visit:    COVID-19  -     COVID-19 Routine Screening  - Discharge home and await results.   - Return to clinic or ED for new or worsening symptoms.   - Follow-up with PCP as needed.     Scribe Attestation:   I, Suad Silvestre, am scribing for, and in the presence of, Tosin Ocasio PA-C. I performed the above scribed service and the documentation accurately describes the services I performed. I attest to the accuracy of the note.    I, Tosin Ocasio PA-C, personally performed the services described in this documentation. All medical record entries made by the scribe were at my direction and in my presence.  I have reviewed the chart and agree that the record reflects my personal performance and is accurate and complete. Tosin Ocasio PA-C.  8:55 AM 07/13/2020

## 2020-07-15 DIAGNOSIS — U07.1 COVID-19 VIRUS DETECTED: ICD-10-CM

## 2020-07-15 LAB — SARS-COV-2 RNA RESP QL NAA+PROBE: DETECTED

## 2021-04-26 ENCOUNTER — PATIENT MESSAGE (OUTPATIENT)
Dept: RESEARCH | Facility: HOSPITAL | Age: 36
End: 2021-04-26

## 2022-04-12 ENCOUNTER — OFFICE VISIT (OUTPATIENT)
Dept: URGENT CARE | Facility: CLINIC | Age: 37
End: 2022-04-12
Payer: MEDICAID

## 2022-04-12 VITALS
RESPIRATION RATE: 19 BRPM | TEMPERATURE: 98 F | HEIGHT: 66 IN | WEIGHT: 115 LBS | BODY MASS INDEX: 18.48 KG/M2 | HEART RATE: 85 BPM | DIASTOLIC BLOOD PRESSURE: 73 MMHG | OXYGEN SATURATION: 98 % | SYSTOLIC BLOOD PRESSURE: 112 MMHG

## 2022-04-12 DIAGNOSIS — R51.9 INTRACTABLE HEADACHE, UNSPECIFIED CHRONICITY PATTERN, UNSPECIFIED HEADACHE TYPE: ICD-10-CM
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
## 2022-04-12 DIAGNOSIS — S39.012A BACK STRAIN, INITIAL ENCOUNTER: ICD-10-CM

## 2022-04-12 PROCEDURE — 72100 X-RAY EXAM L-S SPINE 2/3 VWS: CPT | Mod: S$GLB,,, | Performed by: RADIOLOGY

## 2022-04-12 PROCEDURE — 72040 X-RAY EXAM NECK SPINE 2-3 VW: CPT | Mod: S$GLB,,, | Performed by: RADIOLOGY

## 2022-04-12 PROCEDURE — 99213 OFFICE O/P EST LOW 20 MIN: CPT | Mod: S$GLB,,, | Performed by: FAMILY MEDICINE

## 2022-04-12 PROCEDURE — 72100 XR LUMBAR SPINE 2 OR 3 VIEWS: ICD-10-PCS | Mod: S$GLB,,, | Performed by: RADIOLOGY

## 2022-04-12 PROCEDURE — 72040 XR CERVICAL SPINE 2 OR 3 VIEWS: ICD-10-PCS | Mod: S$GLB,,, | Performed by: RADIOLOGY

## 2022-04-12 PROCEDURE — 99213 PR OFFICE/OUTPT VISIT, EST, LEVL III, 20-29 MIN: ICD-10-PCS | Mod: S$GLB,,, | Performed by: FAMILY MEDICINE

## 2022-04-12 RX ORDER — NORETHINDRONE ACETATE AND ETHINYL ESTRADIOL 1MG-20(21)
1 KIT ORAL
COMMUNITY
Start: 2022-03-08 | End: 2023-03-08

## 2022-04-12 RX ORDER — ACETAMINOPHEN 500 MG
1000 TABLET ORAL
Status: COMPLETED | OUTPATIENT
Start: 2022-04-12 | End: 2022-04-12

## 2022-04-12 RX ADMIN — Medication 1000 MG: at 05:04
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
## 2022-04-12 NOTE — LETTER
April 12, 2022      [facility] - [facility]  [address] [place] [address], [address]  [place] [address]  Phone: [phone]  Fax: [phone]       Patient: Dimas Lynn   YOB: 1985  Date of Visit: 04/12/2022    To Whom It May Concern:    Jaden Lynn  was at [facility] on 04/12/2022. The patient may return to work/school on 4/14/2022 with no restrictions. If you have any questions or concerns, or if I can be of further assistance, please do not hesitate to contact me.    Sincerely,      Kelly Sherman PA-C
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
## 2022-04-20 ENCOUNTER — TELEPHONE (OUTPATIENT)
Dept: URGENT CARE | Facility: CLINIC | Age: 37
End: 2022-04-20
Payer: MEDICAID
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
## 2022-12-16 ENCOUNTER — OFFICE VISIT (OUTPATIENT)
Dept: URGENT CARE | Facility: CLINIC | Age: 37
End: 2022-12-16
Payer: OTHER GOVERNMENT

## 2022-12-16 VITALS
RESPIRATION RATE: 16 BRPM | OXYGEN SATURATION: 99 % | TEMPERATURE: 99 F | WEIGHT: 115 LBS | DIASTOLIC BLOOD PRESSURE: 66 MMHG | HEART RATE: 89 BPM | SYSTOLIC BLOOD PRESSURE: 104 MMHG | HEIGHT: 66 IN | BODY MASS INDEX: 18.48 KG/M2

## 2022-12-16 DIAGNOSIS — S46.912A STRAIN OF LEFT SHOULDER, INITIAL ENCOUNTER: Primary | ICD-10-CM
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
## 2022-12-16 DIAGNOSIS — S39.012A BACK STRAIN, INITIAL ENCOUNTER: ICD-10-CM

## 2022-12-16 DIAGNOSIS — M25.512 ACUTE PAIN OF LEFT SHOULDER: ICD-10-CM

## 2022-12-16 DIAGNOSIS — G44.319 ACUTE POST-TRAUMATIC HEADACHE, NOT INTRACTABLE: ICD-10-CM

## 2022-12-16 DIAGNOSIS — M54.9 DORSALGIA, UNSPECIFIED: ICD-10-CM

## 2022-12-16 PROCEDURE — 72100 XR LUMBAR SPINE 2 OR 3 VIEWS: ICD-10-PCS | Mod: S$GLB,,, | Performed by: RADIOLOGY

## 2022-12-16 PROCEDURE — 73030 XR SHOULDER COMPLETE 2 OR MORE VIEWS LEFT: ICD-10-PCS | Mod: LT,S$GLB,, | Performed by: RADIOLOGY

## 2022-12-16 PROCEDURE — 72070 X-RAY EXAM THORAC SPINE 2VWS: CPT | Mod: S$GLB,,, | Performed by: RADIOLOGY

## 2022-12-16 PROCEDURE — 99203 OFFICE O/P NEW LOW 30 MIN: CPT | Mod: S$GLB,,, | Performed by: PHYSICIAN ASSISTANT

## 2022-12-16 PROCEDURE — 99203 PR OFFICE/OUTPT VISIT, NEW, LEVL III, 30-44 MIN: ICD-10-PCS | Mod: S$GLB,,, | Performed by: PHYSICIAN ASSISTANT

## 2022-12-16 PROCEDURE — 73030 X-RAY EXAM OF SHOULDER: CPT | Mod: LT,S$GLB,, | Performed by: RADIOLOGY

## 2022-12-16 PROCEDURE — 72100 X-RAY EXAM L-S SPINE 2/3 VWS: CPT | Mod: S$GLB,,, | Performed by: RADIOLOGY

## 2022-12-16 PROCEDURE — 72070 XR THORACIC SPINE AP LATERAL: ICD-10-PCS | Mod: S$GLB,,, | Performed by: RADIOLOGY

## 2022-12-16 RX ORDER — ACETAMINOPHEN 500 MG
1000 TABLET ORAL
Status: COMPLETED | OUTPATIENT
Start: 2022-12-16 | End: 2022-12-16

## 2022-12-16 RX ORDER — NAPROXEN 500 MG/1
500 TABLET ORAL 2 TIMES DAILY WITH MEALS
Qty: 30 TABLET | Refills: 0 | Status: SHIPPED | OUTPATIENT
Start: 2022-12-16

## 2022-12-16 RX ORDER — IBUPROFEN 200 MG
800 TABLET ORAL
Status: COMPLETED | OUTPATIENT
Start: 2022-12-16 | End: 2022-12-16

## 2022-12-16 RX ADMIN — Medication 800 MG: at 02:12

## 2022-12-16 RX ADMIN — Medication 1000 MG: at 02:12
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
## 2022-12-19 ENCOUNTER — OFFICE VISIT (OUTPATIENT)
Dept: URGENT CARE | Facility: CLINIC | Age: 37
End: 2022-12-19
Payer: OTHER GOVERNMENT

## 2022-12-19 VITALS
OXYGEN SATURATION: 98 % | TEMPERATURE: 99 F | SYSTOLIC BLOOD PRESSURE: 100 MMHG | RESPIRATION RATE: 16 BRPM | HEART RATE: 82 BPM | BODY MASS INDEX: 18.48 KG/M2 | WEIGHT: 115 LBS | HEIGHT: 66 IN | DIASTOLIC BLOOD PRESSURE: 60 MMHG

## 2022-12-19 DIAGNOSIS — S46.912D STRAIN OF LEFT SHOULDER, SUBSEQUENT ENCOUNTER: ICD-10-CM

## 2022-12-19 DIAGNOSIS — S39.012D BACK STRAIN, SUBSEQUENT ENCOUNTER: Primary | ICD-10-CM

## 2022-12-19 DIAGNOSIS — M54.9 DORSALGIA, UNSPECIFIED: ICD-10-CM
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
## 2022-12-19 PROCEDURE — 96372 PR INJECTION,THERAP/PROPH/DIAG2ST, IM OR SUBCUT: ICD-10-PCS | Mod: S$GLB,,, | Performed by: FAMILY MEDICINE

## 2022-12-19 PROCEDURE — 99214 PR OFFICE/OUTPT VISIT, EST, LEVL IV, 30-39 MIN: ICD-10-PCS | Mod: 25,S$GLB,, | Performed by: FAMILY MEDICINE

## 2022-12-19 PROCEDURE — 96372 THER/PROPH/DIAG INJ SC/IM: CPT | Mod: S$GLB,,, | Performed by: FAMILY MEDICINE

## 2022-12-19 PROCEDURE — 99214 OFFICE O/P EST MOD 30 MIN: CPT | Mod: 25,S$GLB,, | Performed by: FAMILY MEDICINE

## 2022-12-19 RX ORDER — KETOROLAC TROMETHAMINE 30 MG/ML
30 INJECTION, SOLUTION INTRAMUSCULAR; INTRAVENOUS
Status: COMPLETED | OUTPATIENT
Start: 2022-12-19 | End: 2022-12-19

## 2022-12-19 RX ORDER — METHOCARBAMOL 500 MG/1
500 TABLET, FILM COATED ORAL 2 TIMES DAILY PRN
Qty: 30 TABLET | Refills: 0 | Status: SHIPPED | OUTPATIENT
Start: 2022-12-19 | End: 2022-12-29

## 2022-12-19 RX ADMIN — KETOROLAC TROMETHAMINE 30 MG: 30 INJECTION, SOLUTION INTRAMUSCULAR; INTRAVENOUS at 10:12
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
## 2022-12-29 ENCOUNTER — OFFICE VISIT (OUTPATIENT)
Dept: URGENT CARE | Facility: CLINIC | Age: 37
End: 2022-12-29
Payer: OTHER GOVERNMENT

## 2022-12-29 VITALS
DIASTOLIC BLOOD PRESSURE: 66 MMHG | SYSTOLIC BLOOD PRESSURE: 103 MMHG | RESPIRATION RATE: 16 BRPM | HEART RATE: 84 BPM | OXYGEN SATURATION: 99 %

## 2022-12-29 DIAGNOSIS — S39.012D STRAIN OF MUSCLE, FASCIA AND TENDON OF LOWER BACK, SUBSEQUENT ENCOUNTER: Primary | ICD-10-CM

## 2022-12-29 DIAGNOSIS — S46.912D STRAIN OF LEFT SHOULDER, SUBSEQUENT ENCOUNTER: ICD-10-CM

## 2022-12-29 DIAGNOSIS — M54.9 DORSALGIA, UNSPECIFIED: ICD-10-CM

## 2022-12-29 DIAGNOSIS — M54.2 CERVICALGIA: ICD-10-CM

## 2022-12-29 DIAGNOSIS — S39.012D BACK STRAIN, SUBSEQUENT ENCOUNTER: ICD-10-CM
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
## 2022-12-29 PROCEDURE — 99214 OFFICE O/P EST MOD 30 MIN: CPT | Mod: S$GLB,,, | Performed by: FAMILY MEDICINE

## 2022-12-29 PROCEDURE — 99214 PR OFFICE/OUTPT VISIT, EST, LEVL IV, 30-39 MIN: ICD-10-PCS | Mod: S$GLB,,, | Performed by: FAMILY MEDICINE

## 2022-12-29 RX ORDER — LIDOCAINE 50 MG/G
1 PATCH TOPICAL DAILY
Qty: 15 PATCH | Refills: 1 | Status: SHIPPED | OUTPATIENT
Start: 2022-12-29 | End: 2022-12-29

## 2022-12-29 RX ORDER — LIDOCAINE 50 MG/G
1 PATCH TOPICAL DAILY
Qty: 15 PATCH | Refills: 1 | Status: SHIPPED | OUTPATIENT
Start: 2022-12-29
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
## 2023-01-19 ENCOUNTER — OFFICE VISIT (OUTPATIENT)
Dept: URGENT CARE | Facility: CLINIC | Age: 38
End: 2023-01-19
Payer: OTHER GOVERNMENT

## 2023-01-19 VITALS
HEART RATE: 77 BPM | RESPIRATION RATE: 16 BRPM | SYSTOLIC BLOOD PRESSURE: 95 MMHG | DIASTOLIC BLOOD PRESSURE: 64 MMHG | TEMPERATURE: 98 F
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
## 2023-01-19 DIAGNOSIS — M54.9 DORSALGIA, UNSPECIFIED: ICD-10-CM

## 2023-01-19 DIAGNOSIS — S46.912D STRAIN OF LEFT SHOULDER, SUBSEQUENT ENCOUNTER: ICD-10-CM

## 2023-01-19 DIAGNOSIS — M54.2 CERVICALGIA: ICD-10-CM

## 2023-01-19 DIAGNOSIS — S39.012D BACK STRAIN, SUBSEQUENT ENCOUNTER: ICD-10-CM

## 2023-01-19 DIAGNOSIS — Z02.6 ENCOUNTER RELATED TO WORKER'S COMPENSATION CLAIM: Primary | ICD-10-CM

## 2023-01-19 PROCEDURE — 99214 OFFICE O/P EST MOD 30 MIN: CPT | Mod: S$GLB,,, | Performed by: FAMILY MEDICINE

## 2023-01-19 PROCEDURE — 99214 PR OFFICE/OUTPT VISIT, EST, LEVL IV, 30-39 MIN: ICD-10-PCS | Mod: S$GLB,,, | Performed by: FAMILY MEDICINE

## 2023-01-19 RX ORDER — NAPROXEN 500 MG/1
500 TABLET ORAL 2 TIMES DAILY WITH MEALS
Qty: 30 TABLET | Refills: 0 | Status: SHIPPED | OUTPATIENT
Start: 2023-01-19 | End: 2024-01-19

## 2023-01-19 RX ORDER — LIDOCAINE 560 MG/1
1 PATCH PERCUTANEOUS; TOPICAL; TRANSDERMAL 2 TIMES DAILY
Qty: 15 PATCH | Refills: 1 | Status: SHIPPED | OUTPATIENT
Start: 2023-01-19

## 2023-01-19 RX ORDER — METHOCARBAMOL 500 MG/1
500 TABLET, FILM COATED ORAL 2 TIMES DAILY PRN
Qty: 30 TABLET | Refills: 0 | Status: SHIPPED | OUTPATIENT
Start: 2023-01-19 | End: 2023-01-29
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
